# Patient Record
Sex: MALE | Race: WHITE | NOT HISPANIC OR LATINO | URBAN - METROPOLITAN AREA
[De-identification: names, ages, dates, MRNs, and addresses within clinical notes are randomized per-mention and may not be internally consistent; named-entity substitution may affect disease eponyms.]

---

## 2017-04-04 ENCOUNTER — FOLLOW UP ESTABLISHED (OUTPATIENT)
Dept: URBAN - METROPOLITAN AREA CLINIC 64 | Facility: CLINIC | Age: 77
End: 2017-04-04
Payer: COMMERCIAL

## 2017-04-04 PROCEDURE — 92015 DETERMINE REFRACTIVE STATE: CPT | Performed by: OPTOMETRIST

## 2017-04-04 PROCEDURE — 92014 COMPRE OPH EXAM EST PT 1/>: CPT | Performed by: OPTOMETRIST

## 2017-04-04 ASSESSMENT — INTRAOCULAR PRESSURE
OS: 17
OD: 17

## 2017-04-04 ASSESSMENT — VISUAL ACUITY
OS: 20/20
OD: 20/20

## 2018-04-03 ENCOUNTER — FOLLOW UP ESTABLISHED (OUTPATIENT)
Dept: URBAN - METROPOLITAN AREA CLINIC 64 | Facility: CLINIC | Age: 78
End: 2018-04-03
Payer: COMMERCIAL

## 2018-04-03 DIAGNOSIS — H25.13 AGE-RELATED NUCLEAR CATARACT, BILATERAL: ICD-10-CM

## 2018-04-03 PROCEDURE — 92014 COMPRE OPH EXAM EST PT 1/>: CPT | Performed by: OPTOMETRIST

## 2018-04-03 PROCEDURE — 92015 DETERMINE REFRACTIVE STATE: CPT | Performed by: OPTOMETRIST

## 2018-04-03 ASSESSMENT — INTRAOCULAR PRESSURE
OD: 10
OS: 10

## 2018-04-03 ASSESSMENT — VISUAL ACUITY
OS: 20/20
OD: 20/20

## 2018-04-03 ASSESSMENT — KERATOMETRY
OD: 43.35
OS: 42.97

## 2019-03-27 ENCOUNTER — FOLLOW UP ESTABLISHED (OUTPATIENT)
Dept: URBAN - METROPOLITAN AREA CLINIC 64 | Facility: CLINIC | Age: 79
End: 2019-03-27
Payer: COMMERCIAL

## 2019-03-27 PROCEDURE — 92015 DETERMINE REFRACTIVE STATE: CPT | Performed by: OPTOMETRIST

## 2019-03-27 PROCEDURE — 92014 COMPRE OPH EXAM EST PT 1/>: CPT | Performed by: OPTOMETRIST

## 2019-03-27 ASSESSMENT — KERATOMETRY
OS: 42.88
OD: 43.47

## 2019-03-27 ASSESSMENT — VISUAL ACUITY
OS: 20/20
OD: 20/20

## 2019-03-27 ASSESSMENT — INTRAOCULAR PRESSURE
OS: 17
OD: 14

## 2020-07-29 ENCOUNTER — FOLLOW UP ESTABLISHED (OUTPATIENT)
Dept: URBAN - METROPOLITAN AREA CLINIC 64 | Facility: CLINIC | Age: 80
End: 2020-07-29
Payer: COMMERCIAL

## 2020-07-29 PROCEDURE — 92014 COMPRE OPH EXAM EST PT 1/>: CPT | Performed by: OPTOMETRIST

## 2020-07-29 PROCEDURE — 92015 DETERMINE REFRACTIVE STATE: CPT | Performed by: OPTOMETRIST

## 2020-07-29 ASSESSMENT — VISUAL ACUITY
OD: 20/20
OS: 20/20

## 2020-07-29 ASSESSMENT — KERATOMETRY
OS: 42.88
OD: 43.33

## 2020-07-29 ASSESSMENT — INTRAOCULAR PRESSURE
OS: 14
OD: 12

## 2021-08-09 ENCOUNTER — OFFICE VISIT (OUTPATIENT)
Dept: URBAN - METROPOLITAN AREA CLINIC 64 | Facility: CLINIC | Age: 81
End: 2021-08-09
Payer: COMMERCIAL

## 2021-08-09 DIAGNOSIS — H52.03 HYPERMETROPIA, BILATERAL: Primary | ICD-10-CM

## 2021-08-09 PROCEDURE — 92014 COMPRE OPH EXAM EST PT 1/>: CPT | Performed by: OPTOMETRIST

## 2021-08-09 ASSESSMENT — KERATOMETRY
OD: 43.16
OS: 43.08

## 2021-08-09 ASSESSMENT — VISUAL ACUITY
OS: 20/20
OD: 20/20

## 2021-08-09 ASSESSMENT — INTRAOCULAR PRESSURE
OD: 8
OS: 9

## 2022-11-14 ENCOUNTER — OFFICE VISIT (OUTPATIENT)
Dept: URBAN - METROPOLITAN AREA CLINIC 64 | Facility: CLINIC | Age: 82
End: 2022-11-14
Payer: COMMERCIAL

## 2022-11-14 DIAGNOSIS — H25.13 AGE-RELATED NUCLEAR CATARACT, BILATERAL: Primary | ICD-10-CM

## 2022-11-14 DIAGNOSIS — H52.03 HYPERMETROPIA, BILATERAL: ICD-10-CM

## 2022-11-14 PROCEDURE — 92014 COMPRE OPH EXAM EST PT 1/>: CPT | Performed by: OPTOMETRIST

## 2022-11-14 ASSESSMENT — KERATOMETRY
OD: 43.13
OS: 43.02

## 2022-11-14 ASSESSMENT — VISUAL ACUITY
OS: 20/25
OD: 20/20

## 2022-11-14 ASSESSMENT — INTRAOCULAR PRESSURE
OD: 10
OS: 11

## 2022-11-14 NOTE — IMPRESSION/PLAN
Impression: Hypermetropia, bilateral Plan: Discussed diagnosis in detail with patient. New glasses Rx was given today.

## 2023-11-06 ENCOUNTER — OFFICE VISIT (OUTPATIENT)
Dept: URBAN - METROPOLITAN AREA CLINIC 64 | Facility: LOCATION | Age: 83
End: 2023-11-06
Payer: COMMERCIAL

## 2023-11-06 DIAGNOSIS — H43.393 OTHER VITREOUS OPACITIES, BILATERAL: Primary | ICD-10-CM

## 2023-11-06 DIAGNOSIS — H25.13 AGE-RELATED NUCLEAR CATARACT, BILATERAL: ICD-10-CM

## 2023-11-06 PROCEDURE — 92014 COMPRE OPH EXAM EST PT 1/>: CPT | Performed by: OPTOMETRIST

## 2023-11-06 ASSESSMENT — INTRAOCULAR PRESSURE
OS: 14
OD: 15

## 2023-11-06 ASSESSMENT — KERATOMETRY: OD: 43.30

## 2024-11-06 ENCOUNTER — OFFICE VISIT (OUTPATIENT)
Dept: URBAN - METROPOLITAN AREA CLINIC 64 | Facility: LOCATION | Age: 84
End: 2024-11-06
Payer: COMMERCIAL

## 2024-11-06 DIAGNOSIS — H52.4 PRESBYOPIA: Primary | ICD-10-CM

## 2024-11-06 DIAGNOSIS — H25.13 AGE-RELATED NUCLEAR CATARACT, BILATERAL: ICD-10-CM

## 2024-11-06 PROCEDURE — 92014 COMPRE OPH EXAM EST PT 1/>: CPT | Performed by: OPTOMETRIST

## 2024-11-06 ASSESSMENT — INTRAOCULAR PRESSURE
OS: 13
OD: 12

## 2024-11-06 ASSESSMENT — VISUAL ACUITY
OS: 20/20
OD: 20/20